# Patient Record
Sex: MALE | Race: WHITE
[De-identification: names, ages, dates, MRNs, and addresses within clinical notes are randomized per-mention and may not be internally consistent; named-entity substitution may affect disease eponyms.]

---

## 2017-03-31 ENCOUNTER — HOSPITAL ENCOUNTER (OUTPATIENT)
Dept: HOSPITAL 11 - JP.SDS | Age: 62
Discharge: HOME | End: 2017-03-31
Attending: SURGERY
Payer: COMMERCIAL

## 2017-03-31 VITALS — DIASTOLIC BLOOD PRESSURE: 86 MMHG | SYSTOLIC BLOOD PRESSURE: 152 MMHG

## 2017-03-31 DIAGNOSIS — Z91.09: ICD-10-CM

## 2017-03-31 DIAGNOSIS — E11.9: ICD-10-CM

## 2017-03-31 DIAGNOSIS — Z12.11: Primary | ICD-10-CM

## 2017-03-31 DIAGNOSIS — D12.4: ICD-10-CM

## 2017-03-31 DIAGNOSIS — Z88.8: ICD-10-CM

## 2017-03-31 DIAGNOSIS — Z79.899: ICD-10-CM

## 2017-03-31 DIAGNOSIS — E03.9: ICD-10-CM

## 2017-03-31 DIAGNOSIS — D12.3: ICD-10-CM

## 2017-03-31 DIAGNOSIS — I10: ICD-10-CM

## 2017-03-31 DIAGNOSIS — D12.2: ICD-10-CM

## 2017-03-31 DIAGNOSIS — I25.10: ICD-10-CM

## 2017-03-31 DIAGNOSIS — K57.30: ICD-10-CM

## 2017-03-31 PROCEDURE — 45380 COLONOSCOPY AND BIOPSY: CPT

## 2017-04-03 NOTE — OR
DATE OF PROCEDURE:  03/31/2017

 

PROCEDURE:  Colonoscopy.

 

FINDINGS:

1. Ascending colon polyp, 5 mm, completely removed using cold biopsy forceps.

2. Transverse colon polyps #1, 5 mm, completely removed using cold biopsy forceps.

3. Ascending colon polyp #2, approximately 5 mm, completely removed using cold biopsy

    forceps.

4. Descending colon polyp, approximately 5 mm, completely removed using cold biopsy

    forceps.

5. Diverticulosis, mild, limited to the sigmoid colon.

 

COMPLICATIONS:  None.

 

ASSISTANT:  None.

 

PREOPERATIVE DIAGNOSIS:  Screening colonoscopy.

 

POSTOPERATIVE DIAGNOSIS:  Screening colonoscopy.

 

RISKS:  Risks, benefits, alternatives, and limitations, including, but not limited to

infection, bleeding, and perforation were explained to the patient and wished to proceed.

 

ANESTHESIA:  MAC.

 

PROCEDURE IN DETAIL:  The patient was placed in left lateral decubitus position.  Digital

rectal exam was performed without abnormality.  The scope was introduced and advanced

atraumatically to the cecal valve.

 

The scope was brought back through the ascending, transverse, descending colon, and

retroflexed.

 

The aforementioned polyps were all identified and completely removed using cold biopsy

forceps.

 

The diverticulosis would be described as mild and limited to sigmoid colon only.

 

There was no old or new blood.

 

The patient required repositioning due to tortuous colon.  The patient tolerated the

procedure well.

 

 

 

Kingtson Johnson MD

DD:  03/31/2017 08:31:04

DT:  03/31/2017 12:28:40

Job #:  [id number]

## 2018-03-23 ENCOUNTER — HOSPITAL ENCOUNTER (OUTPATIENT)
Dept: HOSPITAL 11 - JP.SDS | Age: 63
Discharge: HOME | End: 2018-03-23
Attending: SURGERY
Payer: COMMERCIAL

## 2018-03-23 VITALS — SYSTOLIC BLOOD PRESSURE: 138 MMHG | DIASTOLIC BLOOD PRESSURE: 82 MMHG

## 2018-03-23 DIAGNOSIS — Z91.09: ICD-10-CM

## 2018-03-23 DIAGNOSIS — E11.9: ICD-10-CM

## 2018-03-23 DIAGNOSIS — D12.3: ICD-10-CM

## 2018-03-23 DIAGNOSIS — K57.30: ICD-10-CM

## 2018-03-23 DIAGNOSIS — Z12.11: Primary | ICD-10-CM

## 2018-03-23 DIAGNOSIS — Z85.038: ICD-10-CM

## 2018-03-23 PROCEDURE — 45380 COLONOSCOPY AND BIOPSY: CPT

## 2018-03-23 PROCEDURE — 45385 COLONOSCOPY W/LESION REMOVAL: CPT

## 2018-03-23 NOTE — OR
DATE OF PROCEDURE:  03/23/2018

 

PREOPERATIVE DIAGNOSIS:  History of multiple tubular adenomas.

 

POSTOPERATIVE DIAGNOSES:  Diverticulosis, 4 colon polyps removed and sent to the laboratory

as 3 specimens, and history of tubular adenomas.

 

PROCEDURES:  Colonoscopy to the cecum with biopsy resection and snare cautery polypectomy.

 

SURGEON:  Jarrod Nguyen MD

 

ANESTHESIA:  IV anesthesia with monitored anesthesia care.

 

INDICATION:  This 62-year-old white male is referred for a colonoscopy because of a history

of tubular adenomas.  He had multiple tubular adenomas removed about a year ago.  I

counseled him for the procedure, including risks and alternatives, and he gave his informed

consent to proceed.

 

DESCRIPTION OF PROCEDURE:  The patient was placed in the left lateral decubitus position.

IV anesthesia was administered by the Anesthesia service.  Time-out was held.  A rectal exam

was performed, which was unremarkable.  The flexible video Olympus colonoscope was

introduced through his anus, up his rectum, and out his colon, all the way to the cecum.  En

route, we saw a few scattered left-sided diverticula.  There was no bleeding or inflammation

associated with them.  En route to the cecum, we saw a small polyp in the transverse colon,

which was removed with the biopsy forceps.  In the proximal transverse colon, we saw a

larger one, which was initially biopsied.  It was too large to remove using this technique,

so a snare was passed about its base.  It was elevated up away from the bowel wall and

amputated as electrocautery was applied.  Once the cecum was reached, the scope was slowly

withdrawn, examining the mucosa throughout.  In the area of the hepatic flexure, we

encountered 2 polyps near each other.  One was removed with the biopsy forceps and the other

with the snare.  The scope was withdrawn with no additional lesions noted.  The scope was

retroflexed with the distal rectum appearing unremarkable.  The scope was straightened and

removed.  He tolerated the procedure well.

 

 

 

 

Jarrod Nguyen MD

DD:  03/23/2018 10:46:46

DT:  03/23/2018 11:54:18

Job #:  [id number]

## 2019-03-08 ENCOUNTER — HOSPITAL ENCOUNTER (OUTPATIENT)
Dept: HOSPITAL 11 - JP.SDS | Age: 64
Discharge: HOME | End: 2019-03-08
Attending: SURGERY
Payer: COMMERCIAL

## 2019-03-08 VITALS — DIASTOLIC BLOOD PRESSURE: 78 MMHG | SYSTOLIC BLOOD PRESSURE: 131 MMHG

## 2019-03-08 DIAGNOSIS — Z88.8: ICD-10-CM

## 2019-03-08 DIAGNOSIS — D12.5: ICD-10-CM

## 2019-03-08 DIAGNOSIS — Z09: Primary | ICD-10-CM

## 2019-03-08 DIAGNOSIS — Z86.010: ICD-10-CM

## 2019-03-08 DIAGNOSIS — K57.30: ICD-10-CM

## 2019-03-08 DIAGNOSIS — Z91.048: ICD-10-CM

## 2019-03-08 DIAGNOSIS — E11.9: ICD-10-CM

## 2019-03-08 DIAGNOSIS — D12.3: ICD-10-CM

## 2019-03-08 DIAGNOSIS — I10: ICD-10-CM

## 2019-03-08 DIAGNOSIS — D12.4: ICD-10-CM

## 2019-03-08 DIAGNOSIS — E78.5: ICD-10-CM

## 2019-03-08 PROCEDURE — 45380 COLONOSCOPY AND BIOPSY: CPT

## 2019-03-08 NOTE — OR
DATE OF PROCEDURE:  03/08/2019

 

PREOPERATIVE DIAGNOSIS:  History of adenomatous colon polyps.

 

POSTOPERATIVE DIAGNOSES:  Diverticulosis, 3 small colon polyps, 25 cm distal transverse

colon and 60 cm, history of adenomatous polyps.

 

PROCEDURE PERFORMED:  Colonoscopy to the cecum with biopsy resection of 3  small colon

polyps at 25 cm from the anal verge, distal transverse colon and 60 cm from anal verge.

 

ANESTHESIA:  IV anesthesia with monitored anesthesia care.

 

INDICATIONS:  This 63-year-old white male is referred for a colonoscopy because of history

of adenomatous colon polyps.  His last colonoscopic exam was done about a year ago.  I

counseled him for the procedure including risks and alternatives, and he gave his informed

consent to proceed.

 

DESCRIPTION OF PROCEDURE:  The patient was placed in the left lateral decubitus position.

IV anesthesia was administered by the Anesthesia Service.  Time-out was held.  A rectal exam

was performed which was unremarkable.  The flexible video Olympus colonoscope was introduced

through his anus, up his rectum, and out his colon all way to the cecum.  En route, at 25 cm

from the anal verge, we saw a small polyp which was removed with the biopsy forceps.

Additionally, en route in the distal transverse colon, another small polyp was seen which

was removed with the biopsy forceps.  Once the cecum was reached, the scope was slowly

withdrawn examining the mucosa throughout.  In the left of the sigmoid colon areas, we saw a

few scattered diverticula.  At 60 cm from the anal verge, we encountered another small polyp

which was removed with the biopsy forceps.  The scope was brought back into the rectum where

it was retroflexed.  The distal rectum appeared unremarkable.  The scope was straightened

and removed.  He tolerated the procedure well.

 

 

 

 

Jarrod Nguyen MD

DD:  03/08/2019 10:20:28

DT:  03/08/2019 15:35:42

Job #:  [id number]

## 2019-03-12 NOTE — XMSREPORT
Referral Summary

 Created on:2019



Patient:Calos Owens

Sex:Male

:1955

External Reference #:[id number]





Demographics







 Address  [address]



   [place] [address]

 

 Home Phone  [phone]

 

 Mobile Phone  [phone]

 

 Email Address  [email or web address]

 

 Preferred Language  1

 

 Marital Status  

 

 Orthodoxy Affiliation  Unknown

 

 Race  Unknown

 

 Ethnic Group  Unknown









Author







 Organization  [facility]

 

 Address  [address] [address]









Support







 Name  Relationship  Address  Phone

 

 Betty Owens  Unavailable  [address]  [phone]



     [place] [address]  









Care Team Providers







 Name  Role  Phone

 

 Giuseppe Parkinson PA-C  Primary Care Provider  [phone]









Reason for Referral

Office Visit (Routine)





 Status  Reason  Specialty  Diagnoses /  Referred By  Referred To



       Procedures  Contact  Contact

 

 Authorized      Diagnoses



Screening for colorectal cancer  Giuseppe Parkinson,  [facility]



         YANA



  [facility]







         [address]  [address]



  Phone:



         Phone:  [phone]



  Fax: [phone]



         Fax: [phone]  





Office Visit (Routine)





 Status  Reason  Specialty  Diagnoses /  Referred By  Referred To



       Procedures  Contact  Contact

 

 New Request      Diagnoses



Type 2 diabetes mellitus with diabetic polyneuropathy, with long-term current 
use of insulin (HCC)  Giuseppe Parkinson,  



       



Procedures



CONTINUE CURRENT WEIGHT MGMT TREATMENT PLAN  YANA



  



         [address] [place], [place]  



         [address]



  



         Phone:  



         [phone]



  



         Fax: [phone]  









Reason for Visit







 Reason  Comments

 

 Diabetes  

 

 ACO Diagnosis Review  







Encounter Details







 Date  Type  Department  Care Team  Description

 

 2019  Office Visit  [facility]  Giuseppe Parkinson,  Type 2 
diabetes mellitus with diabetic polyneuropathy, with long-term current use of 
insulin (HCC) (Primary Dx);



     [facility]  [place]



  Essential hypertension;



     INTERNAL MEDICINE



  [address] [facility]



  Hypothyroidism, unspecified type;



     [address] [place], [place]  Screening for colorectal cancer



     [place], [place]  [id number] [phone] (Fax)  







Allergies







 Active Allergy  Reactions  Severity  Noted Date  Comments

 

 Povidone Iodine      2012  Does not think it is betadine, with



         shoulder thought more the tape.

 

 Adhesive Tape  RASH    10/13/2011  



documented as of this encounter (statuses as of 2019)



Medications







 Medication  Sig  Dispensed  Refills  Start Date  End Date  Status

 

 Misc. Devices  Supply  200 Each  6  2017    Active



 MiscIndications: Type  requested: BD          



 2 diabetes mellitus  Ultra Fine 31          



 with diabetic  gauge needles          



 neuropathy, with  8 mm.          



 long-term current use  Diagnosis:          



 of insulin ([facility])  Diabetes          



   Mellitus.          



   Length of          



   need: 99          



   years.          

 

 NOVOLOG FLEXPEN 100  INJECT 4 UNITS  15 mL  2  2018    Active



 UNIT/ML pen  UNDER THE SKIN          



 injectionIndications:  IN THE          



 Nephropathy due to  MORNING, 5          



 secondary diabetes  UNITS AT NOON,          



 ([facility]), Type 2  AND 6 UNITS IN          



 diabetes mellitus  THE EVENING          



 with diabetic            



 neuropathy, with            



 long-term current use            



 of insulin ([facility])            

 

 NIFEdipine CR osmotic  TAKE 1 TABLET  90 Tab  2  2018    Active



 (PROCARDIA XL) 30 MG  BY MOUTH EVERY          



 24 hour tablet  DAY          

 

 cyclobenzaprine  TAKE 1 TABLET  90 Tab  3  2018    Active



 (FLEXERIL) 10 MG  BY MOUTH          



 tabletIndications:  EVERYDAY AT          



 Strain of neck  BEDTIME          



 muscle, initial            



 encounter, Painful            



 arc syndrome of left            



 shoulder,            



 Degeneration of            



 lumbar intervertebral            



 disc            

 

 levothyroxine  TAKE 1/2  45 Tab  0  2019    Active



 (SYNTHROID) 125 MCG  TABLET BY          



 tabletIndications:  MOUTH EVERY          



 Hypothyroidism,  DAY          



 unspecified type            

 

 simvastatin (ZOCOR)  TAKE 1 TABLET  90 Tab  2  2019    Active



 40 MG  BY MOUTH EVERY          



 tabletIndications:  EVENING.          



 Dyslipidemia            

 

 fosinopril (MONOPRIL)  Take 1 Tab by  90 Tab  2  2019    Active



 40 MG  mouth one time          



 tabletIndications:  a day.          



 Essential            



 hypertension            

 

 metFORMIN  Take 1 Tab by  180 Tab  0  02/10/2019    Active



 (GLUCOPHAGE) 1000 MG  mouth two          



 tabletIndications:  times a day.          



 Type 2 diabetes  WITH FOOD          



 mellitus with            



 diabetic neuropathy,            



 with long-term            



 current use of            



 insulin ([facility]),            



 Nephropathy due to            



 secondary diabetes            



 ([facility])            

 

 ONETOUCH  USE TO TEST  200 Strip  0  2019    Active



 VERIOIndications:  BLOOD GLUCOSE          



 Type 2 diabetes  TWO TIMES A          



 mellitus with  DAY.          



 diabetic neuropathy,            



 with long-term            



 current use of            



 insulin ([facility])            

 

 insulin detemir  Inject 14  30 mL  0  2019    Active



 (LEVEMIR FLEXTOUCH)  Units under          



 100 UNIT/ML pen  the skin at          



 injection  bedtime.          

 

 ammonium lactate  Apply  1 Bottle  11  2015  Discontinued



 (LAC-HYDRIN) 12 %  topically two        9  



 LotionIndications:  times a day.          



 Diabetes mellitus due            



 to underlying            



 condition with            



 diabetic neuropathy            



 ([facility]), Callus            

 

 NIFEdipine SR (ADALAT  Take 1 Tab by  90 Tab  3  2017  
Discontinued



 CC) 30 MG 24 hour  mouth one time        9  



 tablet  a day. SWALLOW          



   WHOLE. DO NOT          



   CRUSH OR CHEW          

 

 FREESTYLE  Check blood  50 Each  10  2017  Discontinued



 LITEIndications: Type  sugars 1-2        9  



 2 diabetes mellitus  times daily as          



 with diabetic  needed.          



 neuropathy, with            



 long-term current use            



 of insulin ([facility])            

 

 glucose blood VI  Patient tests  100 Each  1  2018  
Discontinued



 testIndications: Type  1-2 times        9  



 2 diabetes mellitus  daily. One          



 with diabetic  Touch strips.          



 polyneuropathy, with            



 long-term current use            



 of insulin ([facility])            

 

 LEVEMIR FLEXTOUCH 100  INJECT 27  30 mL  0  08/10/2018  02/21/201  Discontinued



 UNIT/ML pen  UNITS UNDER        9  



 injectionIndications:  THE SKIN AT          



 Type 2 diabetes  BEDTIME.          



 mellitus with            



 diabetic neuropathy,            



 with long-term            



 current use of            



 insulin ([facility])            



documented as of this encounter (statuses as of 2019)



Active Problems







 Problem  Noted Date

 

 Type 2 diabetes mellitus with diabetic polyneuropathy  2016

 

 Painful arc syndrome of left shoulder  2016

 

 S/P Bilateral L5-S1 diskectomy on 2014

 

 Herniated nucleus pulposus, lumbar  2014

 

 Degeneration of lumbar intervertebral disc  2014

 

 Postlaminectomy syndrome, lumbar region  2014

 

 Episodic recurrent vertigo  2012









 Overview: 







 Etiology unclear, no obvious trigger, w/u so far benign









 Impingement syndrome of right shoulder  2012

 

 Diabetes mellitus with neuropathy  10/13/2011

 

 HTN (hypertension)  10/13/2011

 

 Dyslipidemia  10/13/2011

 

 Low back pain  10/13/2011

 

 Foot drop s/p back surgery, left  10/13/2011

 

 Osteoarthritis of right shoulder region  10/13/2011

 

 Peripheral neuropathy  10/13/2011

 

 Environmental allergies  10/13/2011

 

 Hypothyroidism  10/12/2011



documented as of this encounter (statuses as of 2019)



Resolved Problems







 Problem  Noted Date  Resolved Date

 

 Varicose vein of legs, bilat  10/13/2011  2013

 

 Carpal tunnel syndrome on both sides  10/13/2011  2013

 

 Tinnitus  10/13/2011  2013

 

 Heart palpitations  10/13/2011  2013



documented as of this encounter (statuses as of 2019)



Immunizations







 Name  Dates Previously Given  Next Due

 

 Influenza Quad Preservative Free  10/11/2018, 2017,  



   2016, 10/15/2015, 10/09/2013  

 

 Influenza Seasonal Inj A,B  10/28/2014, 10/05/2011, 10/13/2010  

 

 Influenza Seasonal Inj A,B Preservative  10/23/2012  



 Free    

 

 Influenza Unspecified Formulation  10/23/2012, 10/05/2011, 10/13/2010  

 

 METHYLPREDNISOLONE ACETATE  2015, 2015  

 

 Pneumococcal Conjugate, (Prevnar)13-valent  2009  

 

 Pneumococcal Conjugate, (Prevnar)7-valent  02/15/2005  

 

 Pneumovax 23  2010, 2009, 02/15/2005  

 

 TD >7yrs With Preservative  02/15/2005  

 

 TRIAMCINOLONE ACETONIDE  2011  

 

 Tdap >7 years  2015  

 

 Tetanus (Plain)  02/15/2005  

 

 Zoster Zostavax (Shingles)  2015  



documented as of this encounter



Social History







 Tobacco Use  Types  Packs/Day  Years Used  Date

 

 Former Smoker  Cigarettes  2  15  Quit: 1986









 Smokeless Tobacco: Never Used      









 Alcohol Use  Drinks/Week  oz/Week  Comments

 

 No  0 Standard drinks or equivalent  0.0  









 Sex Assigned at Birth  Date Recorded

 

 Not on file  









 Job Start Date  Occupation  Industry

 

 Not on file  Not on file  Not on file









 Travel History  Travel Start  Travel End









 No recent travel history available.



documented as of this encounter



Last Filed Vital Signs







 Vital Sign  Reading  Time Taken

 

 Blood Pressure  124/80  2019  8:18 AM CST

 

 Pulse  100  2019  8:18 AM CST

 

 Temperature  36.2 C (97.2 F)  2019  8:18 AM CST

 

 Respiratory Rate  18  2019  8:18 AM CST

 

 Oxygen Saturation  97%  2019  8:18 AM CST

 

 Inhaled Oxygen Concentration  -  -

 

 Weight  104.2 kg (229 lb 11.5 oz)  2019  8:18 AM CST

 

 Height  185.4 cm (6' 1")  2019  8:18 AM CST

 

 Body Mass Index  30.31  2019  8:18 AM CST



documented in this encounter



Functional Status







 Functional Status  Response  Date of Assessment

 

 Patient's Vision Adequate to Safely Complete Daily  No  2014



 Activities    

 

 Patient's Memory Adequate to Safely Complete Daily  No  2014



 Activities    









 Cognitive Status  Response  Date of Assessment

 

 Patient's Judgment Adequate to Safely Complete Daily  No  2014



 Activities    



documented as of this encounter



Progress Notes

Giuseppe Parkinson PA-C - 2019  8:30 AM CSTFormatting of this note might be 
different from the original.

ASSESSMENT/PLAN



Showed Calos the BMI and told this should be 25 or less and made suggestions as 
how to accomplish this .

Estimated body mass index is 30.31 kg/m as calculated from the following:

  Height as of this encounter: 6' 1" (1.854 m).

  Weight as of this encounter: 229 lb 11.5 oz (104.2 kg).

Weight Management / BMI follow-up plan:Dietary surveillance and counseling



1.  (E11.42,  Z79.4) Type 2 diabetes mellitus with diabetic polyneuropathy, 
with long-term current use of insulin (HCC)  (primary encounter diagnosis)

Plan: CONTINUE CURRENT WEIGHT MGMT TREATMENT PLAN

- A1c is 8.2 today, was 6.8 six months ago. He has gained some weight and been 
a little less active over the winter.  Weight is up 6 lbs from August.  He will 
be more active in the spring and summer asweather improves. He has reduced his 
Levemir dose down to 14 units now (was on 27) at HS. Is also onnovolog at 4, 5, 
and 6 units with meals along with metformin 1000 mg PO bid. He checks BS 
multiple times daily. AM fasting has been around , 2 hr PP around 180.  
BMI is now 30. He walks daily 3-5miles normally but has needed to use treadmill 
in winter which he does not like. He denies any chestpain, palpitations, SOB, 
KNOX, PND, orthopnea, dizziness, light-headedness, syncope or peripheral edema. 
No change in his neuropathy, stable. He is on aspirin and statin. Good HTN 
control. Does have some albuminuria but is on ARB.

- will continue on current medications, he will adjust his Levemir up by 1-2 
units and continue to monitor his BS.  He will work on increased activity and 
weight loss as the spring and summer come and will see him back in 6 months 
with repeat A1c.



2.  (I10) Essential hypertension

Plan:

- stable, well controlled currently on fosinopril 40 mg PO once daily and 
nifedipine SR 30 mg once daily.  BP is 124/80.  Continue meds.



3.  (E03.9) Hypothyroidism, unspecified type

Plan:

- previously stable on current dose of levothyroxine.  TSH pending.



4.  (Z12.11,  Z12.12) Screening for colorectal cancer

Plan:

- last colonoscopy 3/23/18 with polyps, recommended recheck in 1 year. Will 
place order today.



CC/HPI



Calos Owens is a 63 year old male seen today for a thorough evaluation of 
his medical problems and health maintenance review.  A1c is 8.2 today, was 6.8 
six months ago. He has gained some weight and been a little less active over 
the winter.  Weight is up 6 lbs from August.  He will be more activein the 
spring and summer as weather improves. He has reduced his Levemir dose down to 
14 units now (was on 27) at HS. Is also on novolog at 4, 5, and 6 units with 
meals along with metformin 1000 mg PO bid. He checks BS multiple times daily. 
AM fasting has been around , 2 hr PP around 180.  BMI is now 30. He walks 
daily 3-5 miles normally but has needed to use treadmill in winter which he 
does not like. He denies any chest pain, palpitations, SOB, KNOX, PND, orthopnea
, dizziness, light-headedness, syncope or peripheral edema. No change in his 
neuropathy, stable. He is on aspirin and statin. Good HTN control. Does have 
some albuminuria but is on ARB. will continue on current medications, he will 
adjust his Levemir up by 1-2 units and continue to monitor his BS.  He will 
work on increased activity and weight loss as the spring and summer come and 
will see him back in 6 months with repeat A1c.



Annual eye exam?  within 1 year.

Caffeine use? minimal

Tobacco use: does not smoke.

ETOH? occasional

Seat belt:Yes.  Helmet: N/A

Exercise  yes - stays active but less in winter as has needed to use treadmill 
instead of walking outside.  Weight up 6 lbs.

Last colonoscopy 3/23/18 repeat in 1 year



DM HPI:

Current treatment includes diet, exercise, oral medications and insulin 
injections.

Current monitoring regimen: home blood tests - multiple times daily

Home blood sugar records: see HPI

Last A1C:

Lab Results

Component Value Date

 HGA1C 8.2 2019



Diabetic complications: peripheral neuropathy

Cardiovascular risk factors: diabetes mellitus



ASA use:  yes

Diabetes optimally managed: no

DM education within the past 12 months:  no

Willing to see diabetes educator?   yes - if needed

Eye exam in last 12 months?   yes - 19

Tobacco use at this time?  no



Past Medical History:

Diagnosis Date

 Abnormal stress ECG with treadmill 09

 ischemia pt treating with conservative methods

 Carpal tunnel syndrome on both sides 10/13/2011

 Coronary artery disease 5/18/10

 conservative management per pt

 Diabetes mellitus type 2, controlled (HCC)

 Hypercholesteremia

 Hypertension

 Osteoarthritis

 Rt Ac joint Rt shoulder

 Tinnitus 10/13/2011

 Varicose vein of legs, bilat 10/13/2011

 Venous insufficiency 09



Patient Active Problem List

Diagnosis

 Hypothyroidism

 Diabetes mellitus with neuropathy (HCC)

 HTN (hypertension)

 Dyslipidemia

 Low back pain

 Foot drop s/p back surgery, left

 Osteoarthritis of right shoulder region

 Peripheral neuropathy

 Environmental allergies

 Impingement syndrome of right shoulder

 Episodic recurrent vertigo

 Herniated nucleus pulposus, lumbar

 Degeneration of lumbar intervertebral disc

 Postlaminectomy syndrome, lumbar region

 S/P Bilateral L5-S1 diskectomy on 2014

 Painful arc syndrome of left shoulder

 Type 2 diabetes mellitus with diabetic polyneuropathy (HCC)



Past Surgical History:

Procedure Laterality Date

 ACROMIOPLASTY  12

 BACK SURGERY

 residual drop foot

 CARDIAC CATHERIZATION  ~ 

 Dr. Lacey, essentially normal

 CHOLECYSTECTOMY, OPEN

 COLONOSCOPY  2005

 COLONOSCOPY,BIOPSY  2016

 5 polyps, tubular adenomas, repeat in 1 year

 COLONOSCOPY,BIOPSY  2017

 next colon due in one year per Dr. Johnson

 COLONOSCOPY,REMV LESN,SNARE  2018

 repeat in 1 yr per dr billingsley

 LUMBAR DISC SURGERY Bilateral 2014

 bilateral lumbar 5 / sacral 1 discectomy

 OTHER SURGICAL HISTORY  09

 left great saphenous vein

 SHOULDER ARTHROSCOPY  ~

 left

 WRIST SURGERY

 right wrist fusion



Current Outpatient Medications

Medication Sig

 insulin detemir (LEVEMIR FLEXTOUCH) 100 UNIT/ML pen injection Inject 14 
Units under the skin at bedtime.

 ONETOUCH VERIO USE TO TEST BLOOD GLUCOSE TWO TIMES A DAY.

 metFORMIN (GLUCOPHAGE) 1000 MG tablet Take 1 Tab by mouth two times a day. 
WITH FOOD

 levothyroxine (SYNTHROID) 125 MCG tablet TAKE 1/2 TABLET BY MOUTH EVERY DAY

 simvastatin (ZOCOR) 40 MG tablet TAKE 1 TABLET BY MOUTH EVERY EVENING.

 fosinopril (MONOPRIL) 40 MG tablet Take 1 Tab by mouth one time a day.

 cyclobenzaprine (FLEXERIL) 10 MG tablet TAKE 1 TABLET BY MOUTH EVERYDAY AT 
BEDTIME

 NIFEdipine CR osmotic (PROCARDIA XL) 30 MG 24 hour tablet TAKE 1 TABLET BY 
MOUTH EVERY DAY

 NOVOLOG FLEXPEN 100 UNIT/ML pen injection INJECT 4 UNITS UNDER THE SKIN IN 
THE MORNING, 5 UNITS AT NOON, AND 6 UNITS IN THE EVENING

 [facility] [place] Devices Misc Supply requested: BD Ultra Fine 31 gauge needles 8 mm.  
Diagnosis: Diabetes Mellitus. Length of need: 99 years.



No current facility-administered medications for this visit.



ALLERGIES

Betadine [povidone iodine] and Tape [adhesive tape]



HABITS/LIFESTYLE

Social History



Tobacco Use

 Smoking status: Former Smoker

  Packs/day: 2.00

  Years: 15.00

  Pack years: 30.00

  Types: Cigarettes

  Last attempt to quit: 1986

  Years since quittin.8

 Smokeless tobacco: Never Used

Substance Use Topics

 Alcohol use: No

  Alcohol/week: 0.0 oz



Family History

Problem Relation Age of Onset

 Cancer Mother

     lung

 Diabetes Sister

 Cancer Brother

     kidney

 Diabetes Brother



REVIEW OF SYSTEMS



Constitutional: no unintentional weight loss, fevers, shaking chills. Appetite &
amp; energy as usual.

Skin:  no rashes or lesions of concern

EYES:  no blurry vision, double vision, recent significant visual changes.

Last dilated eye exam: 19

HEENT: no recent cold, cough, runny nose, stuffy nose, sore throat, sinus 
problems. No new auditory or visual complaints.

Resp:no cough, or changes to shortness of breath.

CV:  No  chest pain or palpitations

GI:no dysphagia, nausea, vomiting, change in bowel habits, bloody stools, melena
, black tarry stools.

: no change in urination or blood in urine.

Musculoskeletal:  no joint pain or swelling

NEUROLOGIC:  no headache, dizziness, light-headedness. No tingling, numbness in 
extremities.

Endocrine:  no polyuria, polydipsia, heat or cold intolerance



PHYSICAL EXAM



Vitals:

 19 0818

BP: 124/80

Pulse: 100

Temp: 36.2 C (97.2 F)

Resp: 18

Height: 6' 1" (1.854 m)

Weight: 229 lb 11.5 oz (104.2 kg)

SpO2: 97%

BMI (Calculated): 30.31



Wt Readings from Last 3 Encounters:

19 229 lb 11.5 oz (104.2 kg)

19 235 lb 14.3 oz (107 kg)

18 223 lb 8.7 oz (101.4 kg)



Lab Results

Component Value Date

 HGA1C 8.2 2019

 Lab Results

Component Value Date

 LDLC 104 2018

 Lab Results

Component Value Date

 HDL 44 2018



Lab Results

Component Value Date

 CHOL 171 2018

 Lab Results

Component Value Date

 TRIG 115 2018

 Lab Results

Component Value Date

 ALBCR 664 2018





Hemoglobin A1c (%)

Date Value

2019 8.2 (H)

2018 6.8 (H)

2018 8.0 (H)

2017 9.8 (H)



BP Readings from Last 3 Encounters:

19 124/80

19 128/82

18 138/80



Visit date not found



General Appearance:  Alert &amp; oriented x 3, well groomed

Lungs:  normal respiration, clear to auscultation  and no rales or rhonchi

Heart:  regular rhythm, normal heart sounds, no murmur and no S3-S4

Extremity:  no edema, foot pulses intact



TEST RESULTS:

ALLIED HEALTH/NURSE VISIT on 2019

Component Date Value

 Hemoglobin A1c 2019 8.2*

 Estimated Average Glucose 2019 189



Immunization History

Administered Date(s) Administered

 Influenza Quad Preservative Free 10/09/2013, 10/15/2015, 2016, 2017, 10/11/2018

 Influenza Seasonal Inj A,B 10/13/2010, 10/05/2011, 10/28/2014

 Influenza Seasonal Inj A,B Preservative Free 10/23/2012

 Influenza Unspecified Formulation 10/13/2010, 10/05/2011, 10/23/2012

 METHYLPREDNISOLONE ACETATE 2015, 2015

 Pneumococcal Conjugate, (Prevnar)13-valent 2009

 Pneumococcal Conjugate, (Prevnar)7-valent 02/15/2005

 Pneumovax 23 02/15/2005, 2009, 2010

 TD &gt;7yrs With Preservative 02/15/2005

 TRIAMCINOLONE ACETONIDE 2011

 Tdap &gt;7 years 2015

 Tetanus (Plain) 02/15/2005

 Zoster Zostavax (Shingles) 2015



 Flu annually? yes

 Td up to date? yes

 Shingrix? No, but had zostavax

 Pneumonia? yes - both



Weight Management / BMI follow-up plan: Dietary surveillance and counseling.



Electronically signed by Giuseppe Parkinson PA-C at 2019  8:48 AM 
CSTdocumented in this encounter



Plan of Treatment







 Date  Type  Specialty  Care Team  Description

 

 2019  Appointment  Laboratory  Lab, [address] [address]  

 

 08/15/2019  Appointment  Internal Medicine  Giuseppe Parkinson PA-C



  



       [address] [place] [address] [phone] (Fax)  









 Name  Priority  Associated Diagnoses  Order Schedule

 

 COMPREHENSIVE METABOLIC  Routine  Type 2 diabetes mellitus  Expected: 2019



 PANEL    with diabetic  (Approximate),



     polyneuropathy, with  Expires: 2019



     long-term current use of  



     insulin (HCC)



  



     Essential hypertension  

 

 HEMOGRAM/DIFFERENTIAL  Routine  Type 2 diabetes mellitus  Expected: 2019



     with diabetic  (Approximate),



     polyneuropathy, with  Expires: 2019



     long-term current use of  



     insulin (HCC)



  



     Essential hypertension  

 

 LIPID PANEL  Routine  Type 2 diabetes mellitus  Expected: 2019



     with diabetic  (Approximate),



     polyneuropathy, with  Expires: 2019



     long-term current use of  



     insulin (HCC)



  



     Essential hypertension  

 

 HEMOGLOBIN A1C  Routine  Type 2 diabetes mellitus  Expected: 2019



     with diabetic  (Approximate),



     polyneuropathy, with  Expires: 2019



     long-term current use of  



     insulin (HCC)  

 

 MICROALBUMIN, RANDOM URINE  Routine  Type 2 diabetes mellitus  Expected: 2019



     with diabetic  (Approximate),



     polyneuropathy, with  Expires: 2019



     long-term current use of  



     insulin (HCC)  









 Name  Priority  Associated Diagnoses  Order Schedule

 

 SCHEDULE ENDOSCOPY PROCEDURE  Routine  Screening for colorectal  Ordered: 



 [facility]    cancer  



documented as of this encounter



Visit Diagnoses







 Diagnosis

 

 Type 2 diabetes mellitus with diabetic polyneuropathy, with long-term current 
use of



 insulin (HCC) - Primary

 

 Essential hypertension







 Unspecified essential hypertension

 

 Hypothyroidism, unspecified type

 

 Screening for colorectal cancer







 Special screening for malignant neoplasms, colon



documented in this encounter



Insurance







 Payer  Benefit Plan /  Subscriber ID  Effective  Phone  Address  Type



   Group    Dates      

 

 [place]  xxxxxxxxxxxxxxx  2018-[facility]  [facility]  PRIME          









 Guarantor Name  Account Type  Relation to  Date of  Phone  Billing



     Patient  Birth    Address

 

 Suzette Owenslyn STEVE  Personal/Family    10/17/1956  [phone]  [id number] [address]







         (Home)  [place]



           [id number]



documented as of this encounter



Advance Directives

For more information, please contact:[address], 
[place] [address]





 Code Status  Date Activated  Date Inactivated  Comments

 

 Full Code  2014  5:35 AM  2014  4:18 PM